# Patient Record
Sex: FEMALE | Race: OTHER | HISPANIC OR LATINO | Employment: UNEMPLOYED | URBAN - METROPOLITAN AREA
[De-identification: names, ages, dates, MRNs, and addresses within clinical notes are randomized per-mention and may not be internally consistent; named-entity substitution may affect disease eponyms.]

---
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
## 2025-01-04 VITALS
RESPIRATION RATE: 18 BRPM | SYSTOLIC BLOOD PRESSURE: 123 MMHG | OXYGEN SATURATION: 97 % | HEART RATE: 78 BPM | TEMPERATURE: 98 F | DIASTOLIC BLOOD PRESSURE: 66 MMHG

## 2025-01-04 PROCEDURE — 99283 EMERGENCY DEPT VISIT LOW MDM: CPT

## 2025-01-05 ENCOUNTER — HOSPITAL ENCOUNTER (EMERGENCY)
Facility: HOSPITAL | Age: 20
Discharge: HOME | End: 2025-01-05
Attending: EMERGENCY MEDICINE
Payer: COMMERCIAL

## 2025-01-05 DIAGNOSIS — N89.8 VAGINAL ITCHING: ICD-10-CM

## 2025-01-05 DIAGNOSIS — A74.9 CHLAMYDIA: ICD-10-CM

## 2025-01-05 DIAGNOSIS — Z20.2 STD EXPOSURE: Primary | ICD-10-CM

## 2025-01-05 LAB
HIV 1+2 AB+HIV1 P24 AG SERPL QL IA: NORMAL
HIV1 P24 AG SER QL: NORMAL
TREPONEMA PALLIDUM IGG+IGM AB [PRESENCE] IN SERUM OR PLASMA BY IMMUNOASSAY: NORMAL

## 2025-01-05 PROCEDURE — 87491 CHLMYD TRACH DNA AMP PROBE: CPT

## 2025-01-05 PROCEDURE — 36415 COLL VENOUS BLD VENIPUNCTURE: CPT | Performed by: EMERGENCY MEDICINE

## 2025-01-05 PROCEDURE — 86780 TREPONEMA PALLIDUM: CPT | Performed by: EMERGENCY MEDICINE

## 2025-01-05 PROCEDURE — 87591 N.GONORRHOEAE DNA AMP PROB: CPT

## 2025-01-05 PROCEDURE — 87806 HIV AG W/HIV1&2 ANTB W/OPTIC: CPT | Performed by: EMERGENCY MEDICINE

## 2025-01-05 NOTE — DISCHARGE INSTRUCTIONS
You are seen in the emergency department for vaginal itching and discharge.  We tested your urine for gonorrhea and chlamydia, HIV, syphilis.  If these are positive, you will receive a phone call and the appropriate treatment will be sent to the pharmacy.  Results will also be [facility].  Please follow-up with your primary care.  Return to the emergency department if you develop fever, pelvic pain, or any new or concerning symptoms.

## 2025-01-05 NOTE — ED PROVIDER NOTES
Time reflects when diagnosis was documented in both MDM as applicable and the Disposition within this note       Time User Action Codes Description Comment    1/5/2025  1:04 AM Pelletier, Sara Add [Z20.2] STD exposure     1/5/2025  1:05 AM Maria Luisa Pelletier Add [N89.8] Vaginal itching           ED Disposition       ED Disposition   Discharge    Condition   Stable    Date/Time   Sun Jan 5, 2025  1:17 AM    Comment   Carmen Hernandez discharge to home/self care.                   Assessment & Plan       Medical Decision Making  Patient is a 19 y.o. female with PMH of depression and ADHD who presents to the ED with vaginal itching and discharge.    Vital signs within normal limits. On exam remarkable exam.    History and physical exam most consistent with Gardnerella vaginalis. However, differential diagnosis included but not limited to gonorrhea, chlamydia, BV.     Plan: Syphilis, HIV, gonorrhea and Chlamydia testing    View ED course above for further discussion on patient workup.     On review of previous records patient was seen at an outside facility.  She was tested for Candida and Gardnerella vaginalis.  She tested positive for both of these.  A prescription for metronidazole has been sent to her pharmacy however patient did not pick it up.  She was unaware that this was sent.    All labs reviewed and utilized in the medical decision making process  All radiology studies independently viewed by me and interpreted by the radiologist.  I reviewed all testing with the patient.     Patient will be called with any positive results.  She will  her prescription for metronidazole at the pharmacy.  Discussed the medication side effects including avoiding alcohol.  Patient will follow-up with primary care.  Patient agrees understands plan.  All questions answered.  Return precautions discussed.    Disposition: Discharge    Portions of the record may have been created with voice recognition software. Occasional wrong  "word or \"sound a like\" substitutions may have occurred due to the inherent limitations of voice recognition software. Read the chart carefully and recognize, using context, where substitutions have occurred.      Amount and/or Complexity of Data Reviewed  Labs: ordered.             Medications - No data to display    ED Risk Strat Scores                                              History of Present Illness       Chief Complaint   Patient presents with    Exposure to STD     PT states she had a yeast infection on the 27th and states she is still having issues. PT also states she also wants to get tested for STDs. No other complaints.        Past Medical History:   Diagnosis Date    ADHD (attention deficit hyperactivity disorder)     Depression       History reviewed. No pertinent surgical history.   Family History   Problem Relation Age of Onset    Depression Mother     Mental illness Mother     Drug abuse Mother     Alcohol abuse Mother     Drug abuse Father     Depression Sister     Cancer Maternal Grandfather       Social History     Tobacco Use    Smoking status: Never    Smokeless tobacco: Never   Substance Use Topics    Drug use: Not Currently     Types: Marijuana      E-Cigarette/Vaping      E-Cigarette/Vaping Substances    Nicotine Yes       I have reviewed and agree with the history as documented.     19-year-old past medical history of ADHD and depression presents for vaginal discharge and itching.  This is not going on for about 2 weeks.  She was seen at urgent care and given fluconazole for a yeast infection.  She describes the discharge as thick and white.  Has a odor to it.  This did not relieve her symptoms.  She is concerned for STDs as she had a new sexual partner with unknown STD history.  Denies fever, pelvic pain, urinary symptoms, chills.      Exposure to STD  Associated symptoms: no abdominal pain, no chest pain, no cough, no diarrhea, no ear pain, no fever, no nausea, no rash, no shortness of " breath, no sore throat and no vomiting        Review of Systems   Constitutional:  Negative for chills and fever.   HENT:  Negative for ear pain and sore throat.    Eyes:  Negative for pain and visual disturbance.   Respiratory:  Negative for cough and shortness of breath.    Cardiovascular:  Negative for chest pain and palpitations.   Gastrointestinal:  Negative for abdominal pain, constipation, diarrhea, nausea and vomiting.   Genitourinary:  Positive for vaginal discharge. Negative for decreased urine volume, dysuria, flank pain, hematuria, pelvic pain and vaginal bleeding.   Musculoskeletal:  Negative for arthralgias and back pain.   Skin:  Negative for color change and rash.   Neurological:  Negative for seizures and syncope.   All other systems reviewed and are negative.          Objective       ED Triage Vitals [01/04/25 2341]   Temperature Pulse Blood Pressure Respirations SpO2 Patient Position - Orthostatic VS   98 °F (36.7 °C) 78 123/66 18 97 % --      Temp src Heart Rate Source BP Location FiO2 (%) Pain Score    -- Monitor -- -- 6      Vitals      Date and Time Temp Pulse SpO2 Resp BP Pain Score FACES Pain Rating User   01/04/25 2341 98 °F (36.7 °C) 78 97 % 18 123/66 6 -- AG            Physical Exam  Vitals and nursing note reviewed.   Constitutional:       General: She is not in acute distress.     Appearance: She is well-developed. She is not ill-appearing.   HENT:      Head: Normocephalic and atraumatic.   Eyes:      Conjunctiva/sclera: Conjunctivae normal.   Cardiovascular:      Rate and Rhythm: Normal rate and regular rhythm.      Heart sounds: No murmur heard.  Pulmonary:      Effort: Pulmonary effort is normal. No respiratory distress.      Breath sounds: Normal breath sounds.   Abdominal:      Palpations: Abdomen is soft.      Tenderness: There is no abdominal tenderness. There is no guarding or rebound.   Musculoskeletal:         General: No swelling.      Cervical back: Neck supple.   Skin:      General: Skin is warm and dry.      Capillary Refill: Capillary refill takes less than 2 seconds.   Neurological:      Mental Status: She is alert.   Psychiatric:         Mood and Affect: Mood normal.         Results Reviewed       Procedure Component Value Units Date/Time    RPR-Syphilis Screening (Total Syphilis IGG/IGM) [[id number]]  (Normal) Collected: 01/05/25 0125    Lab Status: Final result Specimen: Blood from Arm, Right Updated: 01/05/25 0941     Syphilis Total Antibody Non-reactive    Narrative:      Test performed on the [employer] system using multiplex flow immunoassay methodology.    RAPID HIV 1/2 AB-AG COMBO for 12 years old and above [[id number]]  (Normal) Collected: 01/05/25 0125    Lab Status: Final result Specimen: Blood from Arm, Right Updated: 01/05/25 0216     Rapid HIV 1 AND 2 Non-Reactive     HIV-1 P24 Ag Screen Non-Reactive    Narrative:      Negative for HIV-1 p24 Antigen.  Negative for HIV-1 and/or HIV-2 Antibody.    Chlamydia/GC amplified DNA by PCR [[id number]] Collected: 01/05/25 0125    Lab Status: In process Specimen: Urine, Other Updated: 01/05/25 0130            No orders to display       Procedures    ED Medication and Procedure Management   Prior to Admission Medications   Prescriptions Last Dose Informant Patient Reported? Taking?   lisdexamfetamine (VYVANSE) 50 MG capsule   Yes No   Sig: Take one tab/cap by mouth daily      Facility-Administered Medications: None     Discharge Medication List as of 1/5/2025  1:17 AM        CONTINUE these medications which have NOT CHANGED    Details   lisdexamfetamine (VYVANSE) 50 MG capsule Take one tab/cap by mouth daily, Historical Med           No discharge procedures on file.  ED SEPSIS DOCUMENTATION   Time reflects when diagnosis was documented in both MDM as applicable and the Disposition within this note       Time User Action Codes Description Comment    1/5/2025  1:04 AM Maria Luisa Pelletier Add [Z20.2] STD exposure     1/5/2025  1:05 AM  Maria Luisa Pelletier Add [N89.8] Vaginal itching                  Maria Luisa Pelletier,   01/05/25 1129

## 2025-01-06 ENCOUNTER — RESULTS FOLLOW-UP (OUTPATIENT)
Dept: EMERGENCY DEPT | Facility: HOSPITAL | Age: 20
End: 2025-01-06

## 2025-01-06 ENCOUNTER — OFFICE VISIT (OUTPATIENT)
Dept: URGENT CARE | Age: 20
End: 2025-01-06
Payer: COMMERCIAL

## 2025-01-06 VITALS
SYSTOLIC BLOOD PRESSURE: 120 MMHG | RESPIRATION RATE: 18 BRPM | TEMPERATURE: 98 F | OXYGEN SATURATION: 98 % | DIASTOLIC BLOOD PRESSURE: 80 MMHG | HEART RATE: 72 BPM

## 2025-01-06 DIAGNOSIS — A54.9 GONORRHEA: Primary | ICD-10-CM

## 2025-01-06 LAB
C TRACH DNA SPEC QL NAA+PROBE: POSITIVE
N GONORRHOEA DNA SPEC QL NAA+PROBE: POSITIVE

## 2025-01-06 PROCEDURE — 99213 OFFICE O/P EST LOW 20 MIN: CPT | Performed by: STUDENT IN AN ORGANIZED HEALTH CARE EDUCATION/TRAINING PROGRAM

## 2025-01-06 PROCEDURE — 96372 THER/PROPH/DIAG INJ SC/IM: CPT | Performed by: STUDENT IN AN ORGANIZED HEALTH CARE EDUCATION/TRAINING PROGRAM

## 2025-01-06 RX ORDER — DOXYCYCLINE 100 MG/1
100 CAPSULE ORAL 2 TIMES DAILY
Qty: 14 CAPSULE | Refills: 0 | Status: SHIPPED | OUTPATIENT
Start: 2025-01-06 | End: 2025-01-13

## 2025-01-06 RX ORDER — LIDOCAINE HYDROCHLORIDE 10 MG/ML
1 INJECTION, SOLUTION EPIDURAL; INFILTRATION; INTRACAUDAL; PERINEURAL ONCE
Status: COMPLETED | OUTPATIENT
Start: 2025-01-06 | End: 2025-01-06

## 2025-01-06 RX ORDER — CEFTRIAXONE 500 MG/1
500 INJECTION, POWDER, FOR SOLUTION INTRAMUSCULAR; INTRAVENOUS ONCE
Status: COMPLETED | OUTPATIENT
Start: 2025-01-06 | End: 2025-01-06

## 2025-01-06 RX ADMIN — LIDOCAINE HYDROCHLORIDE 1 ML: 10 INJECTION, SOLUTION EPIDURAL; INFILTRATION; INTRACAUDAL; PERINEURAL at 20:09

## 2025-01-06 RX ADMIN — CEFTRIAXONE 500 MG: 500 INJECTION, POWDER, FOR SOLUTION INTRAMUSCULAR; INTRAVENOUS at 20:09

## 2025-01-07 NOTE — PROGRESS NOTES
[facility]        NAME: Carmen Hernandez is a 19 y.o. female  : 2005    MRN: [id number]  DATE: 2025  TIME: 7:58 PM    Assessment and Plan   Gonorrhea [A54.9]  1. Gonorrhea  cefTRIAXone (ROCEPHIN) injection 500 mg    lidocaine (PF) (XYLOCAINE-MPF) 1 % injection 1 mL            Patient Instructions   Please complete your doxycycline prescription to treat chlamydia.  You are given an injection of ceftriaxone to treat gonorrhea today.  Ensure to inform any intimate partners of the diagnosis and need for testing.  It is important that both you and any partners complete full treatment before resuming intercourse.  At minimum, you must post complete 7 days treatment for chlamydia.  If your symptoms are not fully resolving, please follow-up with your PCP or gynecologist.  If you have any severe worsening symptoms such as severe pain, fevers, please go to the ER.    Chief Complaint     Chief Complaint   Patient presents with    Vaginal Discharge     Went to an urgent care - dx with a yeast infection. Went to the ED and was tested for BV and STDs. Positive for gonorrhea. Patient is here for medication for gonorrhea.           History of Present Illness       Patient presents for treatment for gonorrhea.  Recently seen in the ER with vaginal discharge, STI testing revealed positive gonorrhea and chlamydia, she was already prescribed doxycycline.  She was called about the positive gonorrhea result and informed of need for treatment.  HIV and syphilis testing was negative.  No new complaints today.    Vaginal Discharge  The patient's primary symptoms include vaginal discharge.       Review of Systems   Review of Systems   Genitourinary:  Positive for vaginal discharge.   All other systems reviewed and are negative.        Current Medications       Current Outpatient Medications:     doxycycline hyclate (VIBRAMYCIN) 100 mg capsule, Take 1 capsule (100 mg total) by mouth 2 (two) times a day for 7 days,  Disp: 14 capsule, Rfl: 0    lisdexamfetamine (VYVANSE) 50 MG capsule, Take one tab/cap by mouth daily, Disp: , Rfl:     Current Facility-Administered Medications:     cefTRIAXone (ROCEPHIN) injection 500 mg, 500 mg, Intramuscular, Once,     lidocaine (PF) (XYLOCAINE-MPF) 1 % injection 1 mL, 1 mL, Infiltration, Once,     Current Allergies     Allergies as of 01/06/2025    (No Known Allergies)            The following portions of the patient's history were reviewed and updated as appropriate: allergies, current medications, past family history, past medical history, past social history, past surgical history and problem list.     Past Medical History:   Diagnosis Date    ADHD (attention deficit hyperactivity disorder)     Depression        No past surgical history on file.    Family History   Problem Relation Age of Onset    Depression Mother     Mental illness Mother     Drug abuse Mother     Alcohol abuse Mother     Drug abuse Father     Depression Sister     Cancer Maternal Grandfather          Medications have been verified.        Objective   /80   Pulse 72   Temp 98 °F (36.7 °C)   Resp 18   LMP 12/18/2024   SpO2 98%   Patient's last menstrual period was 12/18/2024.       Physical Exam     Physical Exam  Vitals and nursing note reviewed.   Constitutional:       General: She is not in acute distress.     Appearance: She is not toxic-appearing.   HENT:      Head: Normocephalic and atraumatic.      Right Ear: External ear normal.      Left Ear: External ear normal.      Nose: Nose normal.      Mouth/Throat:      Mouth: Mucous membranes are moist.   Eyes:      Extraocular Movements: Extraocular movements intact.      Conjunctiva/sclera: Conjunctivae normal.   Skin:     General: Skin is warm and dry.   Neurological:      Mental Status: She is alert.

## 2025-01-07 NOTE — ED ATTENDING ATTESTATION
1/4/2025  I, Oj Morrow MD, saw and evaluated the patient. I have discussed the patient with the resident/non-physician practitioner and agree with the resident's/non-physician practitioner's findings, Plan of Care, and MDM as documented in the resident's/non-physician practitioner's note, except where noted. All available labs and Radiology studies were reviewed.  I was present for key portions of any procedure(s) performed by the resident/non-physician practitioner and I was immediately available to provide assistance.       At this point I agree with the current assessment done in the Emergency Department.  I have conducted an independent evaluation of this patient a history and physical is as follows:    ED Course     Patient presents for evaluation due to vaginal itching and discharge.  Patient was recently seen at [facility] and diagnosed with yeast infection and treated with Diflucan.  Review of records indicates that patient also tested positive for Gardnerella which she states that she was not given any antibiotics for this.  Patient reports new sexual partner and is requesting STD testing.  Exam: AAOx3, NAD. A/P: Vaginal discharge, STD testing.  Will treat with Flagyl.  Will check for chlamydia, gonorrhea, and after discussion with patient we will also test for HIV and RPR.  Will treat if testing is positive.    Critical Care Time  Procedures

## 2025-01-07 NOTE — PATIENT INSTRUCTIONS
Please complete your doxycycline prescription to treat chlamydia.  You are given an injection of ceftriaxone to treat gonorrhea today.  Ensure to inform any intimate partners of the diagnosis and need for testing.  It is important that both you and any partners complete full treatment before resuming intercourse.  At minimum, you must post complete 7 days treatment for chlamydia.  If your symptoms are not fully resolving, please follow-up with your PCP or gynecologist.  If you have any severe worsening symptoms such as severe pain, fevers, please go to the ER.

## 2025-07-25 ENCOUNTER — OFFICE VISIT (OUTPATIENT)
Dept: URGENT CARE | Age: 20
End: 2025-07-25
Payer: COMMERCIAL

## 2025-07-25 VITALS
SYSTOLIC BLOOD PRESSURE: 120 MMHG | OXYGEN SATURATION: 99 % | WEIGHT: 145 LBS | HEART RATE: 72 BPM | DIASTOLIC BLOOD PRESSURE: 70 MMHG | BODY MASS INDEX: 21.98 KG/M2 | TEMPERATURE: 97.6 F | RESPIRATION RATE: 18 BRPM | HEIGHT: 68 IN

## 2025-07-25 DIAGNOSIS — R39.9 UTI SYMPTOMS: Primary | ICD-10-CM

## 2025-07-25 LAB
SL AMB  POCT GLUCOSE, UA: ABNORMAL
SL AMB LEUKOCYTE ESTERASE,UA: ABNORMAL
SL AMB POCT BILIRUBIN,UA: ABNORMAL
SL AMB POCT BLOOD,UA: ABNORMAL
SL AMB POCT CLARITY,UA: ABNORMAL
SL AMB POCT COLOR,UA: ABNORMAL
SL AMB POCT KETONES,UA: ABNORMAL
SL AMB POCT NITRITE,UA: ABNORMAL
SL AMB POCT PH,UA: 7.5
SL AMB POCT SPECIFIC GRAVITY,UA: 1
SL AMB POCT URINE HCG: NORMAL
SL AMB POCT URINE PROTEIN: 2000
SL AMB POCT UROBILINOGEN: 0.2

## 2025-07-25 PROCEDURE — 81002 URINALYSIS NONAUTO W/O SCOPE: CPT

## 2025-07-25 PROCEDURE — 87086 URINE CULTURE/COLONY COUNT: CPT

## 2025-07-25 PROCEDURE — 81025 URINE PREGNANCY TEST: CPT

## 2025-07-25 PROCEDURE — 99213 OFFICE O/P EST LOW 20 MIN: CPT

## 2025-07-25 RX ORDER — PHENAZOPYRIDINE HYDROCHLORIDE 100 MG/1
100 TABLET, FILM COATED ORAL 3 TIMES DAILY PRN
Qty: 10 TABLET | Refills: 0 | Status: SHIPPED | OUTPATIENT
Start: 2025-07-25

## 2025-07-25 RX ORDER — NITROFURANTOIN 25; 75 MG/1; MG/1
100 CAPSULE ORAL 2 TIMES DAILY
Qty: 10 CAPSULE | Refills: 0 | Status: SHIPPED | OUTPATIENT
Start: 2025-07-25 | End: 2025-07-30

## 2025-07-25 NOTE — PROGRESS NOTES
[facility]  Name: Carmen Hernandez      : 2005      MRN: [id number]  Encounter Provider: AYUSH Jesus  Encounter Date: 2025   [facility]  :  Urine dip performed in office suggests possible infection, urine culture results pending and should be available in MyChart within 48-72 hours.   Please begin antibiotics as directed.   Stay well hydrated, may use Azo or cranberry juice products as needed for symptoms relief.   Follow up with PCP if no relief within 3-5 days.   Go to ED for fever or flank pain.    Assessment & Plan  UTI symptoms    Orders:    POCT urine dip    Urine culture    POCT urine HCG    nitrofurantoin (MACROBID) 100 mg capsule; Take 1 capsule (100 mg total) by mouth 2 (two) times a day for 5 days    phenazopyridine (PYRIDIUM) 100 mg tablet; Take 1 tablet (100 mg total) by mouth 3 (three) times a day as needed for bladder spasms        Patient Instructions  Patient Education     Urinary Tract Infection, Adult ED   General Information   You came to the Emergency Department (ED) for a urinary tract infection or UTI. Most UTIs are infections in either your bladder or your kidneys. Bladder infections are more common and may also be called cystitis. Kidney infections are more serious and may also be called pyelonephritis. You need antibiotics to treat a UTI. It is important to take all of your antibiotics even if you start to feel better.  What care is needed at home?   Call your regular doctor to let them know you were in the ED. Make a follow-up appointment if you were told to.  For the first day or so, you may want to take an over-the-counter medicine, like phenazopyridine. This will help to numb your bladder. You will also not have the strong urge to urinate. This medicine causes your urine and tears to look orange. If you have kidney disease, talk to your doctor before taking this medicine.  To lower your chance of getting a UTI in the  future, you can:  Drink extra fluids.  If you have sex, urinate right afterwards.  When do I need to get emergency help?   Return to the ED if:   You have very bad pain in your back, shoulder, or belly.  You have a fever of 102.2°F (39°C); shaking chills or sweats even though you are taking antibiotics.  When do I need to call the doctor?   You have a fever up to 100.4°F (38°C).  You notice more blood in your urine.  Your signs get worse or do not improve within 24 hours of starting treatment.  You are not able to urinate for more than 8 hours  Your signs come back after treatment has stopped.  You have new or worsening symptoms.  Last Reviewed Date   2021-03-12  Consumer Information Use and Disclaimer   This generalized information is a limited summary of diagnosis, treatment, and/or medication information. It is not meant to be comprehensive and should be used as a tool to help the user understand and/or assess potential diagnostic and treatment options. It does NOT include all information about conditions, treatments, medications, side effects, or risks that may apply to a specific patient. It is not intended to be medical advice or a substitute for the medical advice, diagnosis, or treatment of a health care provider based on the health care provider's examination and assessment of a patient’s specific and unique circumstances. Patients must speak with a health care provider for complete information about their health, medical questions, and treatment options, including any risks or benefits regarding use of medications. This information does not endorse any treatments or medications as safe, effective, or approved for treating a specific patient. [employer] and its affiliates disclaim any warranty or liability relating to this information or the use thereof. The use of this information is governed by the Terms of Use, available at https://www.WebTVer.com/en/know/clinical-effectiveness-terms    Copyright   Follow up with PCP in 3-5 days.  Proceed to  ER if symptoms worsen.    If tests are performed, our office will contact you with results only if changes need to made to the care plan discussed with you at the visit. You can review your full results on [facility] MyChart.    Chief Complaint:   Chief Complaint   Patient presents with    Possible UTI     Pt began with pelvic pressure and urinary frequency on Monday.      History of Present Illness   Urinary Frequency   This is a new problem. The current episode started in the past 7 days (x 5 days). The problem occurs every urination. The problem has been gradually worsening. Sexually active: Denies concern for STI. Associated symptoms include frequency, hesitancy and urgency. Pertinent negatives include no chills, discharge, flank pain, hematuria, nausea, possible pregnancy, sweats or vomiting. She has tried nothing for the symptoms. The treatment provided no relief. There is no history of catheterization, kidney stones, recurrent UTIs, a single kidney, urinary stasis or a urological procedure.         Review of Systems   Constitutional:  Negative for chills, fatigue and fever.   HENT:  Negative for congestion, ear discharge, ear pain, postnasal drip, rhinorrhea, sinus pressure, sinus pain, sneezing and sore throat.    Eyes: Negative.  Negative for pain, discharge, redness and itching.   Respiratory: Negative.  Negative for apnea, cough, choking, chest tightness, shortness of breath and stridor.    Cardiovascular: Negative.  Negative for chest pain and palpitations.   Gastrointestinal: Negative.  Negative for diarrhea, nausea and vomiting.   Endocrine: Negative.  Negative for polydipsia, polyphagia and polyuria.   Genitourinary:  Positive for difficulty urinating, frequency, hesitancy and urgency. Negative for decreased urine volume, dyspareunia, dysuria, enuresis, flank pain, genital sores, hematuria, menstrual problem, pelvic pain, vaginal bleeding,  "vaginal discharge and vaginal pain.   Musculoskeletal: Negative.  Negative for arthralgias, back pain, myalgias, neck pain and neck stiffness.   Skin: Negative.  Negative for color change and rash.   Allergic/Immunologic: Negative.  Negative for environmental allergies.   Neurological: Negative.  Negative for dizziness, facial asymmetry, light-headedness, numbness and headaches.   Hematological: Negative.  Negative for adenopathy.   Psychiatric/Behavioral: Negative.       Past Medical History   Past Medical History[1]  Past Surgical History[2]  Family History[3]  she reports that she has never smoked. She has never used smokeless tobacco. She reports that she does not currently use drugs after having used the following drugs: Marijuana.  Current Outpatient Medications   Medication Instructions    lisdexamfetamine (VYVANSE) 50 MG capsule Take one tab/cap by mouth daily    nitrofurantoin (MACROBID) 100 mg, Oral, 2 times daily    phenazopyridine (PYRIDIUM) 100 mg, Oral, 3 times daily PRN   Allergies[4]     Objective   /70   Pulse 72   Temp 97.6 °F (36.4 °C)   Resp 18   Ht 5' 8\" (1.727 m)   Wt 65.8 kg (145 lb)   LMP 07/19/2025 (Exact Date)   SpO2 99%   BMI 22.05 kg/m²      Physical Exam  Vitals and nursing note reviewed.   Constitutional:       General: She is not in acute distress.     Appearance: She is well-developed. She is not ill-appearing, toxic-appearing or diaphoretic.      Interventions: She is not intubated.  HENT:      Head: Normocephalic and atraumatic.     Eyes:      Conjunctiva/sclera: Conjunctivae normal.       Cardiovascular:      Rate and Rhythm: Normal rate and regular rhythm.      Heart sounds: Normal heart sounds, S1 normal and S2 normal. Heart sounds not distant. No murmur heard.  Pulmonary:      Effort: Pulmonary effort is normal. No tachypnea, bradypnea, accessory muscle usage, prolonged expiration, respiratory distress or retractions. She is not intubated.      Breath sounds: " "Normal breath sounds. No stridor, decreased air movement or transmitted upper airway sounds. No decreased breath sounds, wheezing, rhonchi or rales.   Abdominal:      General: Abdomen is flat. Bowel sounds are normal.      Palpations: Abdomen is soft.      Tenderness: There is no abdominal tenderness. There is no right CVA tenderness or left CVA tenderness.     Musculoskeletal:         General: No swelling.      Cervical back: Neck supple.     Skin:     General: Skin is warm and dry.      Capillary Refill: Capillary refill takes less than 2 seconds.     Neurological:      Mental Status: She is alert.     Psychiatric:         Mood and Affect: Mood normal.         Portions of the record may have been created with voice recognition software.  Occasional wrong word or \"sound a like\" substitutions may have occurred due to the inherent limitations of voice recognition software.  Read the chart carefully and recognize, using context, where substitutions have occurred.       [1]   Past Medical History:  Diagnosis Date    ADHD (attention deficit hyperactivity disorder)     Depression    [2] No past surgical history on file.  [3]   Family History  Problem Relation Name Age of Onset    Depression Mother      Mental illness Mother      Drug abuse Mother      Alcohol abuse Mother      Drug abuse Father      Depression Sister      Cancer Maternal Grandfather     [4] No Known Allergies    "

## 2025-07-26 LAB — BACTERIA UR CULT: NORMAL
